# Patient Record
Sex: MALE | Race: WHITE | Employment: FULL TIME | URBAN - METROPOLITAN AREA
[De-identification: names, ages, dates, MRNs, and addresses within clinical notes are randomized per-mention and may not be internally consistent; named-entity substitution may affect disease eponyms.]

---

## 2018-02-27 ENCOUNTER — HOSPITAL ENCOUNTER (OUTPATIENT)
Dept: PHYSICAL THERAPY | Age: 33
Discharge: HOME | End: 2018-02-27
Payer: COMMERCIAL

## 2018-02-27 PROCEDURE — 97161 PT EVAL LOW COMPLEX 20 MIN: CPT

## 2018-02-27 PROCEDURE — 97140 MANUAL THERAPY 1/> REGIONS: CPT

## 2018-02-27 NOTE — PROGRESS NOTES
Ambulatory/Rehab Services H2 Model Falls Risk Assessment    Risk Factor Pts. ·   Confusion/Disorientation/Impulsivity  []    4 ·   Symptomatic Depression  []   2 ·   Altered Elimination  []   1 ·   Dizziness/Vertigo  []   1 ·   Gender (Male)  [x]   1 ·   Any administered antiepileptics (anticonvulsants):  []   2 ·   Any administered benzodiazepines:  []   1 ·   Visual Impairment (specify):  []   1 ·   Portable Oxygen Use  []   1 ·   Orthostatic ? BP  []   1 ·   History of Recent Falls (within 3 mos.)  []   5     Ability to Rise from Chair (choose one) Pts. ·   Ability to rise in a single movement  [x]   0 ·   Pushes up, successful in one attempt  []   1 ·   Multiple attempts, but successful  []   3 ·   Unable to rise without assistance  []   4   Total: (5 or greater = High Risk) 1     Falls Prevention Plan:   []                Physical Limitations to Exercise (specify):   []                Mobility Assistance Device (type):   []                Exercise/Equipment Adaptation (specify):    ©2010 [facility] [place] Patent #[id number].  Federal Law prohibits the replication, distribution or use without written permission from [facility] [employer]

## 2018-02-27 NOTE — THERAPY EVALUATION
Linette Moreno  : 1985  Primary: [facility]  Secondary:  [facility] [address], [address] [place] [address]  [phone]   [phone]       OUTPATIENT PHYSICAL THERAPY:Initial Assessment 2018    ICD-10: Treatment Diagnosis: cervicalgia (M54.2); spinal enthesopathy, cervical region (M46.02); strain of muscle and tendon of front wall of thorax, sequela (S29.011S)  Precautions/Allergies:   Review of patient's allergies indicates no known allergies. Fall Risk Score: 1 (? 5 = High Risk)  MD Orders: evaluate and treat MEDICAL/REFERRING DIAGNOSIS:  Neck and shoulder pain [M54.2, M25.519]   DATE OF ONSET: shoulder-6-8 months ago, neck 1 month ago   REFERRING PHYSICIAN: Jitendra Gleason MD  RETURN PHYSICIAN APPOINTMENT: as needed      INITIAL ASSESSMENT:  Mr. Marcela Hernandez is a 27year old male with neck pain and anterior right shoulder pain. He reports a several month history of right shoulder pain and spasm when positioned in an anterior shoulder position that resolves with pec stretching. He reports a recent onset of neck pain that is severe and results in muscle spasm. He reports limitation in turning his head and performing job duties due to pain. He requires skilled PT to address above listed impairments and functional limitations to facilitate return to prior level of function without pain. PROBLEM LIST (Impacting functional limitations):  1. Increased Pain  2. Decreased Activity Tolerance  3. Decreased Work Simplification/Energy Conservation Techniques  4. Decreased Flexibility/Joint Mobility INTERVENTIONS PLANNED:  1. Cold  2. Heat  3. Manual Therapy  4. Neuromuscular Re-education/Strengthening  5. Range of Motion (ROM)  6. Therapeutic Activites  7. Therapeutic Exercise/Strengthening   TREATMENT PLAN:  Effective Dates: 2018 TO 18. Frequency/Duration: 1 time a week for 8 weeks.    GOALS: (Goals have been discussed and agreed upon with patient.)  Discharge Goals: Time Frame: by 4/24/18  1. Pt will report no instances of muscle spasm and pain in anterior shoulder over a 1 month period. 2. 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  3. Pt will report no limitation in turning his head due to pain. 4. Pt will demonstrate functional improvement with NDI to 4% or less. Rehabilitation Potential For Stated Goals: Good            The information in this section was collected on 2/27/18 (except where otherwise noted). HISTORY:   History of Present Injury/Illness (Reason for Referral):  Pt reports a several month history of right anterior shoulder pain that bothers him when he is positioned in a forward shoulder position (armrest on airplane). He reports spasm that is severe and improves with pec stretching position. He also reports lower neck pain that began after returning to neutral from looking down on 1/31/18. He reports an initial painful pop sensation that later resulted in persistent pain at the base of his neck and intermittent muscle spasms and soreness. He reports limitation in bending over, returning to neutral from a flexed position (especially for work activities), turning his head, and reports that pain is worst in AM but improves as he gets going. Past Medical History/Comorbidities:   Mr. Uzma Castano  has no past medical history on file. Mr. Uzma Castano  has a past surgical history that includes hx orthopaedic (2001, 2004). Social History/Living Environment:     Pt lives in a private home with his wife. 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    Dominant Side:         RIGHT  Current Medications:       Current Outpatient Prescriptions:     ciprofloxacin HCl (CIPRO) 500 mg tablet, Take 1 Tab by mouth two (2) times a day., Disp: 28 Tab, Rfl: 0   finasteride (PROPECIA) 1 mg tablet, Take 1 mg by mouth daily. , Disp: , Rfl:     doxylamine succinate (UNISOM) 25 mg tablet, Take 25 mg by mouth nightly as needed for Sleep., Disp: , Rfl:     minoxidil (LONITEN) 10 mg tablet, Take  by mouth daily. , Disp: , Rfl:     omeprazole (PRILOSEC) 10 mg capsule, Take 10 mg by mouth daily. , Disp: , Rfl:    Date Last Reviewed:  2/27/2018     Number of Personal Factors/Comorbidities that affect the Plan of Care: 1-2: MODERATE COMPLEXITY   EXAMINATION:   Observation/Orthostatic Postural Assessment:          Mild anterior shoulder R>L. Palpation:          TTP in left rhomboid and upper trapezius. Decreased mobility with unilateral PA to T1 and T2 with concordant pain. TTP to superior portion of pectoralis major with concordant pain. ROM:        Pain with cervical extension (limited to 10 degrees) and rotation to left (limited to 45 degrees). Pt reports that pain with rotation is intermittent and sometimes with with rotation to right. Strength:          WNL's UQS   Neurological Screen:        Dermatomes:  Normal         Reflexes:  Not tested         Neural Tension Tests:  Not tested         Sensation: normal   Functional Mobility:         Gait/Ambulation:  normal        Transfers:  Independent            (SB=sidebending, Rot=rotation, TTP=tender to palpation, ULTTA=upper limb tension test-A, UQS=upper quarter scan, WNL=within normal limits; ROM measured in degrees)       Body Structures Involved:  1. Joints  2. Muscles Body Functions Affected:  1. Sensory/Pain  2. Neuromusculoskeletal  3. Movement Related Activities and Participation Affected:  1. Self Care  2. Interpersonal Interactions and Relationships  3. Community, Social and [place]   Number of elements (examined above) that affect the Plan of Care: 4+: HIGH COMPLEXITY   CLINICAL PRESENTATION:   Presentation: Stable and uncomplicated: LOW COMPLEXITY   CLINICAL DECISION MAKING:   Outcome Measure:    Tool Used: Neck Disability Index (NDI)  Score:  Initial: 8/50 (16% disability)   Most Recent: X/50 (Date: -- )   Interpretation of Score: The Neck Disability Index is a revised form of the Oswestry Low Back Pain Index and is designed to measure the activities of daily living in person's with neck pain. Each section is scored on a 0-5 scale, 5 representing the greatest disability. The scores of each section are added together for a total score of 50. Medical Necessity:   · Patient is expected to demonstrate progress in range of motion to increase independence with work activities . Reason for Services/Other Comments:  · Patient continues to require present interventions due to patient's inability to move his neck in ways required to perform his job. .   Use of outcome tool(s) and clinical judgement create a POC that gives a: Clear prediction of patient's progress: LOW COMPLEXITY            TREATMENT:   (In addition to Assessment/Re-Assessment sessions the following treatments were rendered)  Pre-treatment Symptoms/Complaints:  Pt reports neck pain with turning to the left and looking up. Pain: Initial:   Pain Intensity 1: 3  Post Session:  2/10     Manual Therapy (    Soft Tissue Mobilization Duration  Duration: 15 Minutes): Manual techniques to facilitate improved motion and decreased pain. (Used abbreviations: MET - muscle energy technique; PNF - proprioceptive neuromuscular facilitation; NMR - neuromuscular re-education; a/p - anterior to posterior; p/a - posterior to anterior)   · PA's to upper thoracic spine and UPA's to T1 and T2  · HVLA to upper thoracic spine   Therapeutic Exercise: ( ):  Exercises per grid below to improve mobility and dynamic movement of neck - bilateral to improve functional turning and bending over. Required minimal verbal cues to promote proper body mechanics. Progressed resistance, range and complexity of movement as indicated.      2/27/18     Activity/Exercise Parameters Parameters Parameters   Patient education Plan of care, expectations                                                        ROM Cat/camel, thoracic rotation quadruped, thoracic extension foam roll, pec stretch foam roll     Self STM  Ball on wall to posterior and to pec with pin and stretch                                MedBridge Portal  Exercises   Quadruped Cat Camel - 10 reps - 1x daily - 7x weekly   Quadruped Thoracic Rotation - Reach Under - 10 reps - 1x daily - 7x weekly   Thoracic Extension Mobilization on Foam Roll - 10 reps - 2 sets - 1x daily - 7x weekly   Supine Chest Stretch on Foam Roll - 2 reps - 60 hold - 1x daily - 7x weekly    Treatment/Session Assessment:    · Response to Treatment:  Pt with improved ROM and pain following manual treatment. Pt to perform HEP and follow up in 1 week. Initiate IASTM as needed for pain relief. · Compliance with Program/Exercises: compliant most of the time. · Recommendations/Intent for next treatment session: \"Next visit will focus on advancements to more challenging activities\".   Total Treatment Duration:  PT Patient Time In/Time Out  Time In: 1300  Time Out: 1400    Tessa Ovalle PT

## 2018-03-06 ENCOUNTER — HOSPITAL ENCOUNTER (OUTPATIENT)
Dept: PHYSICAL THERAPY | Age: 33
Discharge: HOME | End: 2018-03-06
Payer: COMMERCIAL

## 2018-03-06 PROCEDURE — 97140 MANUAL THERAPY 1/> REGIONS: CPT

## 2018-03-06 PROCEDURE — 97110 THERAPEUTIC EXERCISES: CPT

## 2018-03-06 NOTE — PROGRESS NOTES
Randal Knight  : 1985  Primary: [facility]  Secondary:  [facility] [address]  [phone]   [phone]       OUTPATIENT PHYSICAL THERAPY:Daily Note 3/6/2018    ICD-10: Treatment Diagnosis: cervicalgia (M54.2); spinal enthesopathy, cervical region (M46.02); strain of muscle and tendon of front wall of thorax, sequela (S29.011S)  Precautions/Allergies:   Review of patient's allergies indicates no known allergies. Fall Risk Score: 1 (? 5 = High Risk)  MD Orders: evaluate and treat MEDICAL/REFERRING DIAGNOSIS:  Neck and shoulder pain [M54.2, M25.519]   DATE OF ONSET: shoulder-6-8 months ago, neck 1 month ago   REFERRING PHYSICIAN: Roxi Ortiz MD  RETURN PHYSICIAN APPOINTMENT: as needed      INITIAL ASSESSMENT:  Mr. Kaylin Gonzalez is a 27year old male with neck pain and anterior right shoulder pain. He reports a several month history of right shoulder pain and spasm when positioned in an anterior shoulder position that resolves with pec stretching. He reports a recent onset of neck pain that is severe and results in muscle spasm. He reports limitation in turning his head and performing job duties due to pain. He requires skilled PT to address above listed impairments and functional limitations to facilitate return to prior level of function without pain. PROBLEM LIST (Impacting functional limitations):  1. Increased Pain  2. Decreased Activity Tolerance  3. Decreased Work Simplification/Energy Conservation Techniques  4. Decreased Flexibility/Joint Mobility INTERVENTIONS PLANNED:  1. Cold  2. Heat  3. Manual Therapy  4. Neuromuscular Re-education/Strengthening  5. Range of Motion (ROM)  6. Therapeutic Activites  7. Therapeutic Exercise/Strengthening   TREATMENT PLAN:  Effective Dates: 3/6/2018 TO 18. Frequency/Duration: 1 time a week for 8 weeks.    GOALS: (Goals have been discussed and agreed upon with patient.)  Discharge Goals: Time Frame: by 4/24/18  1. Pt will report no instances of muscle spasm and pain in anterior shoulder over a 1 month period. ONGOING  2. 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 ONGOING  3. Pt will report no limitation in turning his head due to pain. ONGOING  4. Pt will demonstrate functional improvement with NDI to 4% or less. ONGOING  Rehabilitation Potential For Stated Goals: Good            The information in this section was collected on 2/27/18 (except where otherwise noted). HISTORY:   History of Present Injury/Illness (Reason for Referral):  Pt reports a several month history of right anterior shoulder pain that bothers him when he is positioned in a forward shoulder position (armrest on airplane). He reports spasm that is severe and improves with pec stretching position. He also reports lower neck pain that began after returning to neutral from looking down on 1/31/18. He reports an initial painful pop sensation that later resulted in persistent pain at the base of his neck and intermittent muscle spasms and soreness. He reports limitation in bending over, returning to neutral from a flexed position (especially for work activities), turning his head, and reports that pain is worst in AM but improves as he gets going. Past Medical History/Comorbidities:   Mr. Kaylin Gonzalez  has no past medical history on file. Mr. Kaylin Gonzalez  has a past surgical history that includes hx orthopaedic (2001, 2004). Social History/Living Environment:     Pt lives in a private home with his wife. 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    Dominant Side:         RIGHT  Current Medications:       Current Outpatient Prescriptions:     ciprofloxacin HCl (CIPRO) 500 mg tablet, Take 1 Tab by mouth two (2) times a day., Disp: 28 Tab, Rfl: 0    finasteride (PROPECIA) 1 mg tablet, Take 1 mg by mouth daily. , Disp: , Rfl:     doxylamine succinate (UNISOM) 25 mg tablet, Take 25 mg by mouth nightly as needed for Sleep., Disp: , Rfl:     minoxidil (LONITEN) 10 mg tablet, Take  by mouth daily. , Disp: , Rfl:     omeprazole (PRILOSEC) 10 mg capsule, Take 10 mg by mouth daily. , Disp: , Rfl:    Date Last Reviewed:  3/6/2018     Number of Personal Factors/Comorbidities that affect the Plan of Care: 1-2: MODERATE COMPLEXITY   EXAMINATION:   Observation/Orthostatic Postural Assessment:          Mild anterior shoulder R>L. Palpation:          TTP in left rhomboid and upper trapezius. Decreased mobility with unilateral PA to T1 and T2 with concordant pain. TTP to superior portion of pectoralis major with concordant pain. ROM:        Pain with cervical extension (limited to 10 degrees) and rotation to left (limited to 45 degrees). Pt reports that pain with rotation is intermittent and sometimes with with rotation to right. Strength:          WNL's UQS   Neurological Screen:        Dermatomes:  Normal         Reflexes:  Not tested         Neural Tension Tests:  Not tested         Sensation: normal   Functional Mobility:         Gait/Ambulation:  normal        Transfers:  Independent            (SB=sidebending, Rot=rotation, TTP=tender to palpation, ULTTA=upper limb tension test-A, UQS=upper quarter scan, WNL=within normal limits; ROM measured in degrees)       Body Structures Involved:  1. Joints  2. Muscles Body Functions Affected:  1. Sensory/Pain  2. Neuromusculoskeletal  3. Movement Related Activities and Participation Affected:  1. Self Care  2. Interpersonal Interactions and Relationships  3.  Community, Social and [place]   Number of elements (examined above) that affect the Plan of Care: 4+: HIGH COMPLEXITY   CLINICAL PRESENTATION:   Presentation: Stable and uncomplicated: LOW COMPLEXITY   CLINICAL DECISION MAKING:   Outcome Measure: Tool Used: Neck Disability Index (NDI)  Score:  Initial: 8/50 (16% disability)   Most Recent: X/50 (Date: -- )   Interpretation of Score: The Neck Disability Index is a revised form of the Oswestry Low Back Pain Index and is designed to measure the activities of daily living in person's with neck pain. Each section is scored on a 0-5 scale, 5 representing the greatest disability. The scores of each section are added together for a total score of 50. Medical Necessity:   · Patient is expected to demonstrate progress in range of motion to increase independence with work activities . Reason for Services/Other Comments:  · Patient continues to require present interventions due to patient's inability to move his neck in ways required to perform his job. .   Use of outcome tool(s) and clinical judgement create a POC that gives a: Clear prediction of patient's progress: LOW COMPLEXITY            TREATMENT:   (In addition to Assessment/Re-Assessment sessions the following treatments were rendered)  Pre-treatment Symptoms/Complaints:  Pt reports neck pain with turning to the left and looking up. Pain: Initial:   Pain Intensity 1: 2  Post Session:  0/10     Manual Therapy (    Soft Tissue Mobilization Duration  Duration: 35 Minutes): Manual techniques to facilitate improved motion and decreased pain. (Used abbreviations: MET - muscle energy technique; PNF - proprioceptive neuromuscular facilitation; NMR - neuromuscular re-education; a/p - anterior to posterior; p/a - posterior to anterior)   · PA's to upper thoracic spine and UPA's to T1 and T2  · HVLA to upper thoracic spine   · STM to left rhomboids and mid-trap  · STM to left upper trap and right scalenes    Therapeutic Exercise: (25 Minutes):  Exercises per grid below to improve mobility and dynamic movement of neck - bilateral to improve functional turning and bending over. Required minimal verbal cues to promote proper body mechanics.   Progressed resistance, range and complexity of movement as indicated. 2/27/18 3/6/18    Activity/Exercise Parameters               Parameters Parameters   Patient education Plan of care, expectations   --                                                  ROM Cat/camel, thoracic rotation quadruped, thoracic extension foam roll, pec stretch foam roll Cervical rotation in neutral, flexion, and extension, thoracic extension     Self STM  Ball on wall to posterior and to pec with pin and stretch  With belt and with ball in doorway to right scalenes and left upper trap    Scapular stabilization  -- 2 x 10 each of I's and T's                         MedBridge Portal  Exercises   Quadruped Cat Camel - 10 reps - 1x daily - 7x weekly   Quadruped Thoracic Rotation - Reach Under - 10 reps - 1x daily - 7x weekly   Thoracic Extension Mobilization on Foam Roll - 10 reps - 2 sets - 1x daily - 7x weekly   Supine Chest Stretch on Foam Roll - 2 reps - 60 hold - 1x daily - 7x weekly    Treatment/Session Assessment:    · Response to Treatment:  Pt with resolution of pain with returning to neutral from flexed position from last treatment to today and resolution of pain with rotation following manual treatment today. Plan to continue to progress as tolerated. · Compliance with Program/Exercises: compliant most of the time. · Recommendations/Intent for next treatment session: \"Next visit will focus on advancements to more challenging activities\".   Total Treatment Duration:  PT Patient Time In/Time Out  Time In: 1300  Time Out: 1400    Kenny Alvarado PT

## 2018-03-13 ENCOUNTER — HOSPITAL ENCOUNTER (OUTPATIENT)
Dept: PHYSICAL THERAPY | Age: 33
Discharge: HOME | End: 2018-03-13
Payer: COMMERCIAL

## 2018-03-13 PROCEDURE — 97140 MANUAL THERAPY 1/> REGIONS: CPT

## 2018-03-13 PROCEDURE — 97110 THERAPEUTIC EXERCISES: CPT

## 2018-03-13 NOTE — PROGRESS NOTES
Margret Mills  : 1985  Primary: [facility]  Secondary:  [facility] [address], [address] [place] [address]  [phone]   [phone]       OUTPATIENT PHYSICAL THERAPY:Daily Note 3/13/2018    ICD-10: Treatment Diagnosis: cervicalgia (M54.2); spinal enthesopathy, cervical region (M46.02); strain of muscle and tendon of front wall of thorax, sequela (S29.011S)  Precautions/Allergies:   Review of patient's allergies indicates no known allergies. Fall Risk Score: 1 (? 5 = High Risk)  MD Orders: evaluate and treat MEDICAL/REFERRING DIAGNOSIS:  Neck and shoulder pain [M54.2, M25.519]   DATE OF ONSET: shoulder-6-8 months ago, neck 1 month ago   REFERRING PHYSICIAN: Marshal Mir MD  RETURN PHYSICIAN APPOINTMENT: as needed      INITIAL ASSESSMENT:  Mr. Resa Favre is a 27year old male with neck pain and anterior right shoulder pain. He reports a several month history of right shoulder pain and spasm when positioned in an anterior shoulder position that resolves with pec stretching. He reports a recent onset of neck pain that is severe and results in muscle spasm. He reports limitation in turning his head and performing job duties due to pain. He requires skilled PT to address above listed impairments and functional limitations to facilitate return to prior level of function without pain. PROBLEM LIST (Impacting functional limitations):  1. Increased Pain  2. Decreased Activity Tolerance  3. Decreased Work Simplification/Energy Conservation Techniques  4. Decreased Flexibility/Joint Mobility INTERVENTIONS PLANNED:  1. Cold  2. Heat  3. Manual Therapy  4. Neuromuscular Re-education/Strengthening  5. Range of Motion (ROM)  6. Therapeutic Activites  7. Therapeutic Exercise/Strengthening   TREATMENT PLAN:  Effective Dates: 3/13/2018 TO 18. Frequency/Duration: 1 time a week for 8 weeks.    GOALS: (Goals have been discussed and agreed upon with patient.)  Discharge Goals: Time Frame: by 4/24/18  1. Pt will report no instances of muscle spasm and pain in anterior shoulder over a 1 month period. ONGOING  2. 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 ONGOING  3. Pt will report no limitation in turning his head due to pain. ONGOING  4. Pt will demonstrate functional improvement with NDI to 4% or less. ONGOING  Rehabilitation Potential For Stated Goals: Good            The information in this section was collected on 2/27/18 (except where otherwise noted). HISTORY:   History of Present Injury/Illness (Reason for Referral):  Pt reports a several month history of right anterior shoulder pain that bothers him when he is positioned in a forward shoulder position (armrest on airplane). He reports spasm that is severe and improves with pec stretching position. He also reports lower neck pain that began after returning to neutral from looking down on 1/31/18. He reports an initial painful pop sensation that later resulted in persistent pain at the base of his neck and intermittent muscle spasms and soreness. He reports limitation in bending over, returning to neutral from a flexed position (especially for work activities), turning his head, and reports that pain is worst in AM but improves as he gets going. Past Medical History/Comorbidities:   Mr. Ann Groves  has no past medical history on file. Mr. Ann Groves  has a past surgical history that includes hx orthopaedic (2001, 2004). Social History/Living Environment:     Pt lives in a private home with his wife. 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    Dominant Side:         RIGHT  Current Medications:       Current Outpatient Prescriptions:     ciprofloxacin HCl (CIPRO) 500 mg tablet, Take 1 Tab by mouth two (2) times a day., Disp: 28 Tab, Rfl: 0    finasteride (PROPECIA) 1 mg tablet, Take 1 mg by mouth daily. , Disp: , Rfl:     doxylamine succinate (UNISOM) 25 mg tablet, Take 25 mg by mouth nightly as needed for Sleep., Disp: , Rfl:     minoxidil (LONITEN) 10 mg tablet, Take  by mouth daily. , Disp: , Rfl:     omeprazole (PRILOSEC) 10 mg capsule, Take 10 mg by mouth daily. , Disp: , Rfl:    Date Last Reviewed:  3/13/2018     Number of Personal Factors/Comorbidities that affect the Plan of Care: 1-2: MODERATE COMPLEXITY   EXAMINATION:   Observation/Orthostatic Postural Assessment:          Mild anterior shoulder R>L. Palpation:          TTP in left rhomboid and upper trapezius. Decreased mobility with unilateral PA to T1 and T2 with concordant pain. TTP to superior portion of pectoralis major with concordant pain. ROM:        Pain with cervical extension (limited to 10 degrees) and rotation to left (limited to 45 degrees). Pt reports that pain with rotation is intermittent and sometimes with with rotation to right. Strength:          WNL's UQS   Neurological Screen:        Dermatomes:  Normal         Reflexes:  Not tested         Neural Tension Tests:  Not tested         Sensation: normal   Functional Mobility:         Gait/Ambulation:  normal        Transfers:  Independent            (SB=sidebending, Rot=rotation, TTP=tender to palpation, ULTTA=upper limb tension test-A, UQS=upper quarter scan, WNL=within normal limits; ROM measured in degrees)       Body Structures Involved:  1. Joints  2. Muscles Body Functions Affected:  1. Sensory/Pain  2. Neuromusculoskeletal  3. Movement Related Activities and Participation Affected:  1. Self Care  2. Interpersonal Interactions and Relationships  3.  Community, Social and [place]   Number of elements (examined above) that affect the Plan of Care: 4+: HIGH COMPLEXITY   CLINICAL PRESENTATION:   Presentation: Stable and uncomplicated: LOW COMPLEXITY   CLINICAL DECISION MAKING:   Outcome Measure: Tool Used: Neck Disability Index (NDI)  Score:  Initial: 8/50 (16% disability)   Most Recent: X/50 (Date: -- )   Interpretation of Score: The Neck Disability Index is a revised form of the Oswestry Low Back Pain Index and is designed to measure the activities of daily living in person's with neck pain. Each section is scored on a 0-5 scale, 5 representing the greatest disability. The scores of each section are added together for a total score of 50. Medical Necessity:   · Patient is expected to demonstrate progress in range of motion to increase independence with work activities . Reason for Services/Other Comments:  · Patient continues to require present interventions due to patient's inability to move his neck in ways required to perform his job. .   Use of outcome tool(s) and clinical judgement create a POC that gives a: Clear prediction of patient's progress: LOW COMPLEXITY            TREATMENT:   (In addition to Assessment/Re-Assessment sessions the following treatments were rendered)  Pre-treatment Symptoms/Complaints:  Pt reports a general improvement from week to week. Pain: Initial:   Pain Intensity 1: 2  Post Session:  0/10     Manual Therapy (    Soft Tissue Mobilization Duration  Duration: 45 Minutes): Manual techniques to facilitate improved motion and decreased pain.  (Used abbreviations: MET - muscle energy technique; PNF - proprioceptive neuromuscular facilitation; NMR - neuromuscular re-education; a/p - anterior to posterior; p/a - posterior to anterior)   · PA's to upper thoracic spine and UPA's to T1 and T2  · HVLA to upper thoracic spine (not performed today)   · STM to left rhomboids and mid-trap  · STM to left upper trap and right scalenes  · MWM to T1-3 with rotation  · First rib mobilization on right with breathing techniques     Therapeutic Exercise: (15 Minutes):  Exercises per grid below to improve mobility and dynamic movement of neck - bilateral to improve functional turning and bending over. Required minimal verbal cues to promote proper body mechanics. Progressed resistance, range and complexity of movement as indicated. 2/27/18 3/6/18 3/13/18   Activity/Exercise Parameters               Parameters Parameters   Patient education Plan of care, expectations   --                                               Updated HEP with self MWM with ball at T3 and rotation to left    ROM Cat/camel, thoracic rotation quadruped, thoracic extension foam roll, pec stretch foam roll Cervical rotation in neutral, flexion, and extension, thoracic extension  Cervical rotation    Self STM  Ball on wall to posterior and to pec with pin and stretch  With belt and with ball in doorway to right scalenes and left upper trap Reviewed ball techniques    Scapular stabilization  -- 2 x 10 each of I's and T's  Head off table: 2 x 10 I's                          Treatment/Session Assessment:    · Response to Treatment:  Pt with improved pain with manual techniques especially with MWM. Discussed performance at home. · Compliance with Program/Exercises: compliant most of the time. · Recommendations/Intent for next treatment session: \"Next visit will focus on advancements to more challenging activities\".   Total Treatment Duration:  PT Patient Time In/Time Out  Time In: 1000  Time Out: Bucky Barragan 112, PT

## 2018-03-20 ENCOUNTER — HOSPITAL ENCOUNTER (OUTPATIENT)
Dept: PHYSICAL THERAPY | Age: 33
Discharge: HOME | End: 2018-03-20
Payer: COMMERCIAL

## 2018-03-20 PROCEDURE — 97110 THERAPEUTIC EXERCISES: CPT

## 2018-03-20 PROCEDURE — 97140 MANUAL THERAPY 1/> REGIONS: CPT

## 2018-03-20 NOTE — PROGRESS NOTES
Chante Fischer  : 1985  Primary: [facility]  Secondary:  [facility] [address]  [phone]   [phone]       OUTPATIENT PHYSICAL THERAPY:Daily Note 3/20/2018    ICD-10: Treatment Diagnosis: cervicalgia (M54.2); spinal enthesopathy, cervical region (M46.02); strain of muscle and tendon of front wall of thorax, sequela (S29.011S)  Precautions/Allergies:   Review of patient's allergies indicates no known allergies. Fall Risk Score: 1 (? 5 = High Risk)  MD Orders: evaluate and treat MEDICAL/REFERRING DIAGNOSIS:  Neck and shoulder pain [M54.2, M25.519]   DATE OF ONSET: shoulder-6-8 months ago, neck 1 month ago   REFERRING PHYSICIAN: Jorge Luis Torres MD  RETURN PHYSICIAN APPOINTMENT: as needed      INITIAL ASSESSMENT:  Mr. Ann Groves is a 27year old male with neck pain and anterior right shoulder pain. He reports a several month history of right shoulder pain and spasm when positioned in an anterior shoulder position that resolves with pec stretching. He reports a recent onset of neck pain that is severe and results in muscle spasm. He reports limitation in turning his head and performing job duties due to pain. He requires skilled PT to address above listed impairments and functional limitations to facilitate return to prior level of function without pain. PROBLEM LIST (Impacting functional limitations):  1. Increased Pain  2. Decreased Activity Tolerance  3. Decreased Work Simplification/Energy Conservation Techniques  4. Decreased Flexibility/Joint Mobility INTERVENTIONS PLANNED:  1. Cold  2. Heat  3. Manual Therapy  4. Neuromuscular Re-education/Strengthening  5. Range of Motion (ROM)  6. Therapeutic Activites  7. Therapeutic Exercise/Strengthening   TREATMENT PLAN:  Effective Dates: 3/20/2018 TO 18. Frequency/Duration: 1 time a week for 8 weeks.    GOALS: (Goals have been discussed and agreed upon with patient.)  Discharge Goals: Time Frame: by 4/24/18  1. Pt will report no instances of muscle spasm and pain in anterior shoulder over a 1 month period. ONGOING  2. 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 Pt will report no limitation in turning his head due to pain. ONGOING, excellent progress as of 3/20/18  4. Pt will demonstrate functional improvement with NDI to 4% or less. ONGOING  Rehabilitation Potential For Stated Goals: Good            The information in this section was collected on 2/27/18 (except where otherwise noted). HISTORY:   History of Present Injury/Illness (Reason for Referral):  Pt reports a several month history of right anterior shoulder pain that bothers him when he is positioned in a forward shoulder position (armrest on airplane). He reports spasm that is severe and improves with pec stretching position. He also reports lower neck pain that began after returning to neutral from looking down on 1/31/18. He reports an initial painful pop sensation that later resulted in persistent pain at the base of his neck and intermittent muscle spasms and soreness. He reports limitation in bending over, returning to neutral from a flexed position (especially for work activities), turning his head, and reports that pain is worst in AM but improves as he gets going. Past Medical History/Comorbidities:   Mr. Uzma Castano  has no past medical history on file. Mr. Uzma Castano  has a past surgical history that includes hx orthopaedic (2001, 2004). Social History/Living Environment:     Pt lives in a private home with his wife. 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    Dominant Side:         RIGHT  Current Medications:       Current Outpatient Prescriptions:     ciprofloxacin HCl (CIPRO) 500 mg tablet, Take 1 Tab by mouth two (2) times a day., Disp: 28 Tab, Rfl: 0    finasteride (PROPECIA) 1 mg tablet, Take 1 mg by mouth daily. , Disp: , Rfl:     doxylamine succinate (UNISOM) 25 mg tablet, Take 25 mg by mouth nightly as needed for Sleep., Disp: , Rfl:     minoxidil (LONITEN) 10 mg tablet, Take  by mouth daily. , Disp: , Rfl:     omeprazole (PRILOSEC) 10 mg capsule, Take 10 mg by mouth daily. , Disp: , Rfl:    Date Last Reviewed:  3/20/2018     Number of Personal Factors/Comorbidities that affect the Plan of Care: 1-2: MODERATE COMPLEXITY   EXAMINATION:   Observation/Orthostatic Postural Assessment:          Mild anterior shoulder R>L. Palpation:          TTP in left rhomboid and upper trapezius. Decreased mobility with unilateral PA to T1 and T2 with concordant pain. TTP to superior portion of pectoralis major with concordant pain. ROM:        Pain with cervical extension (limited to 10 degrees) and rotation to left (limited to 45 degrees). Pt reports that pain with rotation is intermittent and sometimes with with rotation to right. Strength:          WNL's UQS   Neurological Screen:        Dermatomes:  Normal         Reflexes:  Not tested         Neural Tension Tests:  Not tested         Sensation: normal   Functional Mobility:         Gait/Ambulation:  normal        Transfers:  Independent            (SB=sidebending, Rot=rotation, TTP=tender to palpation, ULTTA=upper limb tension test-A, UQS=upper quarter scan, WNL=within normal limits; ROM measured in degrees)       Body Structures Involved:  1. Joints  2. Muscles Body Functions Affected:  1. Sensory/Pain  2. Neuromusculoskeletal  3. Movement Related Activities and Participation Affected:  1. Self Care  2. Interpersonal Interactions and Relationships  3.  Community, Social and [place]   Number of elements (examined above) that affect the Plan of Care: 4+: HIGH COMPLEXITY   CLINICAL PRESENTATION:   Presentation: Stable and uncomplicated: LOW COMPLEXITY   CLINICAL DECISION MAKING:   Outcome Measure: Tool Used: Neck Disability Index (NDI)  Score:  Initial: 8/50 (16% disability)   Most Recent: X/50 (Date: -- )   Interpretation of Score: The Neck Disability Index is a revised form of the Oswestry Low Back Pain Index and is designed to measure the activities of daily living in person's with neck pain. Each section is scored on a 0-5 scale, 5 representing the greatest disability. The scores of each section are added together for a total score of 50. Medical Necessity:   · Patient is expected to demonstrate progress in range of motion to increase independence with work activities . Reason for Services/Other Comments:  · Patient continues to require present interventions due to patient's inability to move his neck in ways required to perform his job. .   Use of outcome tool(s) and clinical judgement create a POC that gives a: Clear prediction of patient's progress: LOW COMPLEXITY            TREATMENT:   (In addition to Assessment/Re-Assessment sessions the following treatments were rendered)  Pre-treatment Symptoms/Complaints:  Pt reports that his neck is still about the same as last week---catching with rotation that occurs about 1 time a day when talking to patients, but that his anterior shoulder pain has seemed to increase in pain and frequency. He reports that he is not sure if using the tennis ball is aggravating his symptoms. Pain: Initial:   Pain Intensity 1: 2  Post Session:  0/10     Manual Therapy (    Soft Tissue Mobilization Duration  Duration: 45 Minutes): Manual techniques to facilitate improved motion and decreased pain.  (Used abbreviations: MET - muscle energy technique; PNF - proprioceptive neuromuscular facilitation; NMR - neuromuscular re-education; a/p - anterior to posterior; p/a - posterior to anterior)   · PA's to upper thoracic spine and UPA's to T1 and T2 (not performed today)   · HVLA to upper thoracic spine (not performed today)   · STM to right pec major and minor with pin and stretch techniques  · STM to left upper trap and right scalenes  · MWM to T1-3 with rotation (not performed today)       Therapeutic Exercise: (15 Minutes):  Exercises per grid below to improve mobility and dynamic movement of neck - bilateral to improve functional turning and bending over. Required minimal verbal cues to promote proper body mechanics. Progressed resistance, range and complexity of movement as indicated. 3/13/18 3/20/18    Activity/Exercise Parameters     Patient education Updated HEP with self MWM with ball at T3 and rotation to left  Discontinue use of tennis ball for mobilization to right scalenes---initiate additional rotator cuff strengthening and reassess in 2 weeks. ROM Cervical rotation  --    Self STM  Reviewed ball techniques  --    Scapular stabilization  Head off table: 2 x 10 I's  Reviewed     Bilateral ER -- 3 sets to fatigue with red band    Up, out and down  -- 3 x 10 red band               Treatment/Session Assessment:    · Response to Treatment:  Pt's anterior shoulder symptoms appear to be related to altered kinematics of right shoulder. Initiated additional rotator cuff strengthening and discontinue self scalene mobilization and reassess in 2 weeks. · Compliance with Program/Exercises: compliant most of the time. · Recommendations/Intent for next treatment session: \"Next visit will focus on advancements to more challenging activities\".   Total Treatment Duration:  PT Patient Time In/Time Out  Time In: 1000  Time Out: Bucky Barragan 112, PT

## 2018-03-27 ENCOUNTER — APPOINTMENT (OUTPATIENT)
Dept: PHYSICAL THERAPY | Age: 33
End: 2018-03-27
Payer: COMMERCIAL

## 2018-04-03 ENCOUNTER — HOSPITAL ENCOUNTER (OUTPATIENT)
Dept: PHYSICAL THERAPY | Age: 33
Discharge: HOME | End: 2018-04-03
Payer: COMMERCIAL

## 2018-04-03 PROCEDURE — 97110 THERAPEUTIC EXERCISES: CPT

## 2018-04-03 NOTE — PROGRESS NOTES
Yuliya Diver  : 1985  Primary: [facility]  Secondary:  [facility] [address]  [phone]   [phone]       OUTPATIENT PHYSICAL THERAPY:Daily Note 4/3/2018    ICD-10: Treatment Diagnosis: cervicalgia (M54.2); spinal enthesopathy, cervical region (M46.02); strain of muscle and tendon of front wall of thorax, sequela (S29.011S)  Precautions/Allergies:   Review of patient's allergies indicates no known allergies. Fall Risk Score: 1 (? 5 = High Risk)  MD Orders: evaluate and treat MEDICAL/REFERRING DIAGNOSIS:  Neck and shoulder pain [M54.2, M25.519]   DATE OF ONSET: shoulder-6-8 months ago, neck 1 month ago   REFERRING PHYSICIAN: Keven Webb MD  RETURN PHYSICIAN APPOINTMENT: as needed      INITIAL ASSESSMENT:  Mr. Jassi Willams is a 27year old male with neck pain and anterior right shoulder pain. He reports a several month history of right shoulder pain and spasm when positioned in an anterior shoulder position that resolves with pec stretching. He reports a recent onset of neck pain that is severe and results in muscle spasm. He reports limitation in turning his head and performing job duties due to pain. He requires skilled PT to address above listed impairments and functional limitations to facilitate return to prior level of function without pain. PROBLEM LIST (Impacting functional limitations):  1. Increased Pain  2. Decreased Activity Tolerance  3. Decreased Work Simplification/Energy Conservation Techniques  4. Decreased Flexibility/Joint Mobility INTERVENTIONS PLANNED:  1. Cold  2. Heat  3. Manual Therapy  4. Neuromuscular Re-education/Strengthening  5. Range of Motion (ROM)  6. Therapeutic Activites  7. Therapeutic Exercise/Strengthening   TREATMENT PLAN:  Effective Dates: 4/3/2018 TO 18. Frequency/Duration: 1 time a week for 8 weeks.    GOALS: (Goals have been discussed and agreed upon with patient.)  Discharge Goals: Time Frame: by 4/24/18  1. Pt will report no instances of muscle spasm and pain in anterior shoulder over a 1 month period. ONGOING  2. 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 Pt will report no limitation in turning his head due to pain. ONGOING, excellent progress as of 3/20/18  4. Pt will demonstrate functional improvement with NDI to 4% or less. ONGOING  Rehabilitation Potential For Stated Goals: Good            The information in this section was collected on 2/27/18 (except where otherwise noted). HISTORY:   History of Present Injury/Illness (Reason for Referral):  Pt reports a several month history of right anterior shoulder pain that bothers him when he is positioned in a forward shoulder position (armrest on airplane). He reports spasm that is severe and improves with pec stretching position. He also reports lower neck pain that began after returning to neutral from looking down on 1/31/18. He reports an initial painful pop sensation that later resulted in persistent pain at the base of his neck and intermittent muscle spasms and soreness. He reports limitation in bending over, returning to neutral from a flexed position (especially for work activities), turning his head, and reports that pain is worst in AM but improves as he gets going. Past Medical History/Comorbidities:   Mr. Sujey Lu  has no past medical history on file. Mr. Sujey Lu  has a past surgical history that includes hx orthopaedic (2001, 2004). Social History/Living Environment:     Pt lives in a private home with his wife. 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    Dominant Side:         RIGHT  Current Medications:       Current Outpatient Prescriptions:     ciprofloxacin HCl (CIPRO) 500 mg tablet, Take 1 Tab by mouth two (2) times a day., Disp: 28 Tab, Rfl: 0    finasteride (PROPECIA) 1 mg tablet, Take 1 mg by mouth daily. , Disp: , Rfl:     doxylamine succinate (UNISOM) 25 mg tablet, Take 25 mg by mouth nightly as needed for Sleep., Disp: , Rfl:     minoxidil (LONITEN) 10 mg tablet, Take  by mouth daily. , Disp: , Rfl:     omeprazole (PRILOSEC) 10 mg capsule, Take 10 mg by mouth daily. , Disp: , Rfl:    Date Last Reviewed:  4/3/2018     Number of Personal Factors/Comorbidities that affect the Plan of Care: 1-2: MODERATE COMPLEXITY   EXAMINATION:   Observation/Orthostatic Postural Assessment:          Mild anterior shoulder R>L. Palpation:          TTP in left rhomboid and upper trapezius. Decreased mobility with unilateral PA to T1 and T2 with concordant pain. TTP to superior portion of pectoralis major with concordant pain. ROM:        Pain with cervical extension (limited to 10 degrees) and rotation to left (limited to 45 degrees). Pt reports that pain with rotation is intermittent and sometimes with with rotation to right. Strength:          WNL's UQS   Neurological Screen:        Dermatomes:  Normal         Reflexes:  Not tested         Neural Tension Tests:  Not tested         Sensation: normal   Functional Mobility:         Gait/Ambulation:  normal        Transfers:  Independent            (SB=sidebending, Rot=rotation, TTP=tender to palpation, ULTTA=upper limb tension test-A, UQS=upper quarter scan, WNL=within normal limits; ROM measured in degrees)       Body Structures Involved:  1. Joints  2. Muscles Body Functions Affected:  1. Sensory/Pain  2. Neuromusculoskeletal  3. Movement Related Activities and Participation Affected:  1. Self Care  2. Interpersonal Interactions and Relationships  3.  Community, Social and [place]   Number of elements (examined above) that affect the Plan of Care: 4+: HIGH COMPLEXITY   CLINICAL PRESENTATION:   Presentation: Stable and uncomplicated: LOW COMPLEXITY CLINICAL DECISION MAKING:   Outcome Measure: Tool Used: Neck Disability Index (NDI)  Score:  Initial: 8/50 (16% disability)   Most Recent: X/50 (Date: -- )   Interpretation of Score: The Neck Disability Index is a revised form of the Oswestry Low Back Pain Index and is designed to measure the activities of daily living in person's with neck pain. Each section is scored on a 0-5 scale, 5 representing the greatest disability. The scores of each section are added together for a total score of 50. Medical Necessity:   · Patient is expected to demonstrate progress in range of motion to increase independence with work activities . Reason for Services/Other Comments:  · Patient continues to require present interventions due to patient's inability to move his neck in ways required to perform his job. .   Use of outcome tool(s) and clinical judgement create a POC that gives a: Clear prediction of patient's progress: LOW COMPLEXITY            TREATMENT:   (In addition to Assessment/Re-Assessment sessions the following treatments were rendered)  Pre-treatment Symptoms/Complaints:  Pt reports that his neck continues to improve and that he only gets the catching sensation a few days per week. He reports that his anterior shoulder pain seems to be increasing in frequency and intensity. He reports that his rotator cuff exercises seem to be easy and dont seem to affect his shoulder. Pain: Initial:   Pain Intensity 1: 0  Post Session:  0/10     Manual Therapy (     ): Manual techniques to facilitate improved motion and decreased pain.  (Used abbreviations: MET - muscle energy technique; PNF - proprioceptive neuromuscular facilitation; NMR - neuromuscular re-education; a/p - anterior to posterior; p/a - posterior to anterior)   · PA's to upper thoracic spine and UPA's to T1 and T2 (not performed today)   · HVLA to upper thoracic spine (not performed today)   · STM to right pec major and minor with pin and stretch techniques  · STM to left upper trap and right scalenes  · MWM to T1-3 with rotation (not performed today)       Therapeutic Exercise: (45 Minutes):  Exercises per grid below to improve mobility and dynamic movement of neck - bilateral to improve functional turning and bending over. Required minimal verbal cues to promote proper body mechanics. Progressed resistance, range and complexity of movement as indicated. 3/13/18 3/20/18 4/3/18   Activity/Exercise Parameters     Patient education Updated HEP with self MWM with ball at T3 and rotation to left  Discontinue use of tennis ball for mobilization to right scalenes---initiate additional rotator cuff strengthening and reassess in 2 weeks. Shoulder anatomy, cuff role in shoulder, physician referral, PNF cable exercises for HEP    ROM Cervical rotation  -- --   Self STM  Reviewed ball techniques  -- --   Scapular stabilization  Head off table: 2 x 10 I's  Reviewed  --   Bilateral ER -- 3 sets to fatigue with red band --   Up, out and down  -- 3 x 10 red band  --             Treatment/Session Assessment:    · Response to Treatment:  Pt reports no change in symptoms with treatment of rotator cuff and scapular stability. Due to plateau in shoulder symptoms, patient is referred to orthopedic surgeon/physician for further evaluation of his shoulder. His therapy is placed on hold at this time. · Compliance with Program/Exercises: compliant most of the time. · Recommendations/Intent for next treatment session: \"Next visit will focus on advancements to more challenging activities\".   Total Treatment Duration:  PT Patient Time In/Time Out  Time In: 1530  Time Out: 600 Sobia Lundy, PT

## 2018-05-18 NOTE — THERAPY DISCHARGE
Maritza Winchester  : 1985  Primary: [facility]  Secondary:  [facility] [address]  [phone]   [phone]       OUTPATIENT PHYSICAL THERAPY:Discontinuation Summary 3/20/2018    ICD-10: Treatment Diagnosis: cervicalgia (M54.2); spinal enthesopathy, cervical region (M46.02); strain of muscle and tendon of front wall of thorax, sequela (S29.011S)  Precautions/Allergies:   Review of patient's allergies indicates no known allergies. Fall Risk Score: 1 (? 5 = High Risk)  MD Orders: evaluate and treat MEDICAL/REFERRING DIAGNOSIS:  Neck and shoulder pain [M54.2, M25.519]   DATE OF ONSET: shoulder-6-8 months ago, neck 1 month ago   REFERRING PHYSICIAN: Devin Simmons MD  RETURN PHYSICIAN APPOINTMENT: as needed      INITIAL ASSESSMENT:  Mr. Ester Victor is a [place] old male with neck pain and anterior right shoulder pain. He reports a several month history of right shoulder pain and spasm when positioned in an anterior shoulder position that resolves with pec stretching. He reports a recent onset of neck pain that is severe and results in muscle spasm. He reports limitation in turning his head and performing job duties due to pain. He requires skilled PT to address above listed impairments and functional limitations to facilitate return to prior level of function without pain. 18: Mr Ester Victor demonstrated excellent improvement in neck symptoms with physical therapy treatment. However, due to plateau in shoulder symptoms, patient is referred to orthopedic surgeon/physician for further evaluation of his shoulder. He is discharged from therapy at this time and will be evaluated for further treatment in future if needed. PROBLEM LIST (Impacting functional limitations):  1. Increased Pain  2. Decreased Activity Tolerance  3. Decreased Work Simplification/Energy Conservation Techniques  4.  Decreased Flexibility/Joint Mobility INTERVENTIONS PLANNED:  1. Cold  2. Heat  3. Manual Therapy  4. Neuromuscular Re-education/Strengthening  5. Range of Motion (ROM)  6. Therapeutic Activites  7. Therapeutic Exercise/Strengthening   TREATMENT PLAN:  Effective Dates: 3/20/2018 TO 4/24/18. Frequency/Duration: 1 time a week for 8 weeks. GOALS: (Goals have been discussed and agreed upon with patient.)  Discharge Goals: Time Frame: by 4/24/18  1. Pt will report no instances of muscle spasm and pain in anterior shoulder over a 1 month period. Did not achieve at time of discharge. 2. 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 Pt will report no limitation in turning his head due to pain. Excellent progress but did not achieve at time of discharge. 4. Pt will demonstrate functional improvement with NDI to 4% or less. Did not achieve at time of discharge. Rehabilitation Potential For Stated Goals: Good            The information in this section was collected on 2/27/18 (except where otherwise noted). HISTORY:   History of Present Injury/Illness (Reason for Referral):  Pt reports a several month history of right anterior shoulder pain that bothers him when he is positioned in a forward shoulder position (armrest on airplane). He reports spasm that is severe and improves with pec stretching position. He also reports lower neck pain that began after returning to neutral from looking down on 1/31/18. He reports an initial painful pop sensation that later resulted in persistent pain at the base of his neck and intermittent muscle spasms and soreness. He reports limitation in bending over, returning to neutral from a flexed position (especially for work activities), turning his head, and reports that pain is worst in AM but improves as he gets going.     Past Medical History/Comorbidities:   Mr. Jose Martin  has no past medical history on file. Mr. Kaylin Gonzalez  has a past surgical history that includes hx orthopaedic (2001, 2004). Social History/Living Environment:     Pt lives in a private home with his wife. 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 Dominant Side:         RIGHT  Current Medications:       Current Outpatient Prescriptions:     ciprofloxacin HCl (CIPRO) 500 mg tablet, Take 1 Tab by mouth two (2) times a day., Disp: 28 Tab, Rfl: 0    finasteride (PROPECIA) 1 mg tablet, Take 1 mg by mouth daily. , Disp: , Rfl:     doxylamine succinate (UNISOM) 25 mg tablet, Take 25 mg by mouth nightly as needed for Sleep., Disp: , Rfl:     minoxidil (LONITEN) 10 mg tablet, Take  by mouth daily. , Disp: , Rfl:     omeprazole (PRILOSEC) 10 mg capsule, Take 10 mg by mouth daily. , Disp: , Rfl:    Date Last Reviewed:  5/18/2018     Number of Personal Factors/Comorbidities that affect the Plan of Care: 1-2: MODERATE COMPLEXITY   EXAMINATION:   Observation/Orthostatic Postural Assessment:          Mild anterior shoulder R>L. Palpation:          TTP in left rhomboid and upper trapezius. Decreased mobility with unilateral PA to T1 and T2 with concordant pain. TTP to superior portion of pectoralis major with concordant pain. ROM:        Pain with cervical extension (limited to 10 degrees) and rotation to left (limited to 45 degrees). Pt reports that pain with rotation is intermittent and sometimes with with rotation to right.    Strength:          WNL's UQS   Neurological Screen:        Dermatomes:  Normal         Reflexes:  Not tested         Neural Tension Tests:  Not tested         Sensation: normal   Functional Mobility:         Gait/Ambulation:  normal        Transfers:  Independent            (SB=sidebending, Rot=rotation, TTP=tender to palpation, ULTTA=upper limb tension test-A, UQS=upper quarter scan, WNL=within normal limits; ROM measured in degrees)       Body Structures Involved:  1. Joints  2. Muscles Body Functions Affected:  1. Sensory/Pain  2. Neuromusculoskeletal  3. Movement Related Activities and Participation Affected:  1. Self Care  2. Interpersonal Interactions and Relationships  3. Community, Social and [place]   Number of elements (examined above) that affect the Plan of Care: 4+: HIGH COMPLEXITY   CLINICAL PRESENTATION:   Presentation: Stable and uncomplicated: LOW COMPLEXITY   CLINICAL DECISION MAKING:   Outcome Measure: Tool Used: Neck Disability Index (NDI)  Score:  Initial: 8/50 (16% disability)   Most Recent: X/50 (Date: -- )   Interpretation of Score: The Neck Disability Index is a revised form of the Oswestry Low Back Pain Index and is designed to measure the activities of daily living in person's with neck pain. Each section is scored on a 0-5 scale, 5 representing the greatest disability. The scores of each section are added together for a total score of 50. Medical Necessity:   · Patient is expected to demonstrate progress in range of motion to increase independence with work activities . Reason for Services/Other Comments:  · Patient continues to require present interventions due to patient's inability to move his neck in ways required to perform his job.  .   Use of outcome tool(s) and clinical judgement create a POC that gives a: Clear prediction of patient's progress: LOW COMPLEXITY            TREATMENT:   Rahul Fountain, PT